# Patient Record
Sex: MALE | Employment: FULL TIME | URBAN - METROPOLITAN AREA
[De-identification: names, ages, dates, MRNs, and addresses within clinical notes are randomized per-mention and may not be internally consistent; named-entity substitution may affect disease eponyms.]

---

## 2017-02-21 DIAGNOSIS — Z13.220 ENCOUNTER FOR LIPID SCREENING FOR CARDIOVASCULAR DISEASE: Primary | ICD-10-CM

## 2017-02-21 DIAGNOSIS — Z13.6 ENCOUNTER FOR LIPID SCREENING FOR CARDIOVASCULAR DISEASE: Primary | ICD-10-CM

## 2017-02-21 DIAGNOSIS — R17 TOTAL BILIRUBIN, ELEVATED: ICD-10-CM

## 2017-02-22 ENCOUNTER — TELEPHONE (OUTPATIENT)
Dept: INTERNAL MEDICINE | Facility: CLINIC | Age: 32
End: 2017-02-22

## 2017-02-22 DIAGNOSIS — Z11.3 SCREEN FOR STD (SEXUALLY TRANSMITTED DISEASE): Primary | ICD-10-CM

## 2017-02-22 NOTE — TELEPHONE ENCOUNTER
----- Message from Costa Ramirez MD sent at 2/21/2017  5:32 PM CST -----  Contact: [phone]  Blood work submitted.  Determine if the patient would like HIV and syphilis testing prior to first appointment.  These tests are typically offered to young patients.  Otherwise, routine blood work has been ordered.    ----- Message -----     From: Lidya Aldana     Sent: 2/21/2017   4:35 PM       To: James SEVILLA Staff    Patient is requesting orders for blood work. Please advise.

## 2017-02-22 NOTE — TELEPHONE ENCOUNTER
----- Message from Costa Ramirez MD sent at 2/22/2017  2:52 PM CST -----  Please add HIV and RPR blood work to the patient scheduled laboratory appointment.

## 2017-03-13 ENCOUNTER — LAB VISIT (OUTPATIENT)
Dept: LAB | Facility: HOSPITAL | Age: 32
End: 2017-03-13
Attending: INTERNAL MEDICINE
Payer: COMMERCIAL

## 2017-03-13 DIAGNOSIS — Z13.220 ENCOUNTER FOR LIPID SCREENING FOR CARDIOVASCULAR DISEASE: ICD-10-CM

## 2017-03-13 DIAGNOSIS — R17 TOTAL BILIRUBIN, ELEVATED: ICD-10-CM

## 2017-03-13 DIAGNOSIS — Z11.3 SCREEN FOR STD (SEXUALLY TRANSMITTED DISEASE): ICD-10-CM

## 2017-03-13 DIAGNOSIS — Z13.6 ENCOUNTER FOR LIPID SCREENING FOR CARDIOVASCULAR DISEASE: ICD-10-CM

## 2017-03-13 LAB
ALBUMIN SERPL BCP-MCNC: 4.3 G/DL
ALP SERPL-CCNC: 58 U/L
ALT SERPL W/O P-5'-P-CCNC: 26 U/L
ANION GAP SERPL CALC-SCNC: 8 MMOL/L
AST SERPL-CCNC: 23 U/L
BASOPHILS # BLD AUTO: 0.02 K/UL
BASOPHILS NFR BLD: 0.4 %
BILIRUB SERPL-MCNC: 1.2 MG/DL
BUN SERPL-MCNC: 15 MG/DL
CALCIUM SERPL-MCNC: 10 MG/DL
CHLORIDE SERPL-SCNC: 105 MMOL/L
CHOLEST/HDLC SERPL: 3 {RATIO}
CO2 SERPL-SCNC: 28 MMOL/L
CREAT SERPL-MCNC: 1 MG/DL
DIFFERENTIAL METHOD: NORMAL
EOSINOPHIL # BLD AUTO: 0.2 K/UL
EOSINOPHIL NFR BLD: 2.8 %
ERYTHROCYTE [DISTWIDTH] IN BLOOD BY AUTOMATED COUNT: 13.3 %
EST. GFR  (AFRICAN AMERICAN): >60 ML/MIN/1.73 M^2
EST. GFR  (NON AFRICAN AMERICAN): >60 ML/MIN/1.73 M^2
GLUCOSE SERPL-MCNC: 95 MG/DL
HCT VFR BLD AUTO: 44.9 %
HDL/CHOLESTEROL RATIO: 33.3 %
HDLC SERPL-MCNC: 114 MG/DL
HDLC SERPL-MCNC: 38 MG/DL
HGB BLD-MCNC: 15.5 G/DL
HIV 1+2 AB+HIV1 P24 AG SERPL QL IA: NEGATIVE
LDLC SERPL CALC-MCNC: 57.6 MG/DL
LYMPHOCYTES # BLD AUTO: 1.8 K/UL
LYMPHOCYTES NFR BLD: 32.4 %
MCH RBC QN AUTO: 28.9 PG
MCHC RBC AUTO-ENTMCNC: 34.5 %
MCV RBC AUTO: 84 FL
MONOCYTES # BLD AUTO: 0.6 K/UL
MONOCYTES NFR BLD: 10.5 %
NEUTROPHILS # BLD AUTO: 2.9 K/UL
NEUTROPHILS NFR BLD: 53.5 %
NONHDLC SERPL-MCNC: 76 MG/DL
PLATELET # BLD AUTO: 178 K/UL
PMV BLD AUTO: 11.2 FL
POTASSIUM SERPL-SCNC: 4.1 MMOL/L
PROT SERPL-MCNC: 7.8 G/DL
RBC # BLD AUTO: 5.36 M/UL
SODIUM SERPL-SCNC: 141 MMOL/L
TRIGL SERPL-MCNC: 92 MG/DL
WBC # BLD AUTO: 5.44 K/UL

## 2017-03-13 PROCEDURE — 80053 COMPREHEN METABOLIC PANEL: CPT

## 2017-03-13 PROCEDURE — 85025 COMPLETE CBC W/AUTO DIFF WBC: CPT

## 2017-03-13 PROCEDURE — 86592 SYPHILIS TEST NON-TREP QUAL: CPT

## 2017-03-13 PROCEDURE — 86703 HIV-1/HIV-2 1 RESULT ANTBDY: CPT

## 2017-03-13 PROCEDURE — 80061 LIPID PANEL: CPT

## 2017-03-13 PROCEDURE — 36415 COLL VENOUS BLD VENIPUNCTURE: CPT | Mod: PO

## 2017-03-14 LAB — RPR SER QL: NORMAL

## 2017-03-21 ENCOUNTER — OFFICE VISIT (OUTPATIENT)
Dept: INTERNAL MEDICINE | Facility: CLINIC | Age: 32
End: 2017-03-21
Payer: COMMERCIAL

## 2017-03-21 VITALS
HEART RATE: 64 BPM | WEIGHT: 201.25 LBS | OXYGEN SATURATION: 98 % | BODY MASS INDEX: 23.29 KG/M2 | DIASTOLIC BLOOD PRESSURE: 80 MMHG | HEIGHT: 78 IN | SYSTOLIC BLOOD PRESSURE: 122 MMHG

## 2017-03-21 DIAGNOSIS — G43.009 MIGRAINE WITHOUT AURA AND WITHOUT STATUS MIGRAINOSUS, NOT INTRACTABLE: Primary | ICD-10-CM

## 2017-03-21 DIAGNOSIS — Q27.9 VENOUS MALFORMATION: ICD-10-CM

## 2017-03-21 DIAGNOSIS — Z23 NEED FOR TDAP VACCINATION: ICD-10-CM

## 2017-03-21 DIAGNOSIS — M25.371 ANKLE INSTABILITY, RIGHT: ICD-10-CM

## 2017-03-21 PROBLEM — E80.6 BENIGN UNCONJUGATED HYPERBILIRUBINEMIA: Status: ACTIVE | Noted: 2017-03-21

## 2017-03-21 PROCEDURE — 99385 PREV VISIT NEW AGE 18-39: CPT | Mod: 25,S$GLB,, | Performed by: INTERNAL MEDICINE

## 2017-03-21 PROCEDURE — 99999 PR PBB SHADOW E&M-EST. PATIENT-LVL III: CPT | Mod: PBBFAC,,, | Performed by: INTERNAL MEDICINE

## 2017-03-21 PROCEDURE — 90471 IMMUNIZATION ADMIN: CPT | Mod: S$GLB,,, | Performed by: INTERNAL MEDICINE

## 2017-03-21 PROCEDURE — 90715 TDAP VACCINE 7 YRS/> IM: CPT | Mod: S$GLB,,, | Performed by: INTERNAL MEDICINE

## 2017-03-21 RX ORDER — SUMATRIPTAN SUCCINATE 100 MG/1
TABLET ORAL
Qty: 8 TABLET | Refills: 2 | Status: SHIPPED | OUTPATIENT
Start: 2017-03-21

## 2017-03-21 NOTE — PROGRESS NOTES
Portions of this note are generated with voice recognition software. Typographical errors may exist.       Patient Name:ROSARIO GUSMAN  Patient MRN:   [id number]    History of Present Illness   ================================================================  ROSARIO GUSMAN is a 31 y.o. male here for primary care visit for  Chief Complaint   Patient presents with    Establish Care    Headache       Past Medical History:   Diagnosis Date    Testicular torsion        History reviewed. No pertinent surgical history.    Review of patient's allergies indicates:  No Known Allergies    No current outpatient prescriptions on file prior to visit.     No current facility-administered medications on file prior to visit.        Family History   Problem Relation Age of Onset    Hyperlipidemia Father     Diabetes Mellitus Neg Hx        Social History     Social History    Marital status: Significant Other     Spouse name: N/A    Number of children: N/A    Years of education: N/A     Occupational History    Not on file.     Social History Main Topics    Smoking status: Never Smoker    Smokeless tobacco: Never Used    Alcohol use Yes      Comment: not often    Drug use: No    Sexual activity: Yes     Partners: Female      Comment: together monogamous since 2016 approximately.  Never tested positive for STD.     Other Topics Concern    Not on file     Social History Narrative    No narrative on file       History   Sexual Activity    Sexual activity: Yes    Partners: Female     Comment: together monogamous since 2016 approximately.  Never tested positive for STD.         SUBJECTIVE:    Headaches.  Since high school the patient states that he has had severe bifrontal headaches.  No classic aura.  Not associated with sinusitis.  Headaches lasting for several days.  Excedrin helping.  Family history of migraine headache paternal uncle.  Headaches occur perhaps once or twice per month.  Triggers are unknown.  Patient has  never tried triptan therapy.  Most headaches improved with Excedrin but not always.    Ankle instability.  Patient states in high school he had severe sprain of his right ankle.  Did not have orthopedic surgery.  States that ever since he has had problems with instability of his ankle and causing him to over pronate.  States that this happens with simple activities even while at work.  Patient does not engage in aerobic activity so it is not a major stumbling block for him now.    Patient is in a mutually monogamous relationship with female partner    Venous malformation.  Patient states that for more than 5 years he has had a varicose vein on his forearm right arm.  States it does not bother him but does not know what it is.  States he does not have significant symptoms from it      Medications Reviewed and Updated    Past medical, family, and social histories were reviewed and updated.    Review of Systems negative unless otherwise noted in history of present illness-  General ROS: negative  Psychological ROS: negative  ENT ROS: negative  Allergy and Immunology ROS: negative  Cardiovascular ROS: negative  Gastrointestinal ROS: negative  Genito-Urinary ROS: negative  Musculoskeletal ROS: negative  Neurological ROS: negative  Dermatological ROS: negative      Allergic:  Review of patient's allergies indicates:  No Known Allergies    OBJECTIVE:  BP: 122/80 Pulse: 64    Wt Readings from Last 3 Encounters:   03/21/17 91.3 kg (201 lb 4.5 oz)    Body mass index is 23.26 kg/(m^2).  Previous Blood Pressure Readings :   BP Readings from Last 3 Encounters:   03/21/17 122/80       GEN: healthy appearing  HEENT: sclera non-icteric, conjunctiva clear.  No significant periodontal disease.  No cervical lymphadenopathy  CV: no peripheral edema regular rate and rhythm.  No murmurs  PULM: breathing non-labored no wheezing bilateral lung fields  ABD: No organomegaly.  Negative Umana sign.  PSYCH: appropriate affect  MSK: able to  rise from chair without assistance  SKIN: normal skin turgor      Pertinent Labs Reviewed           ASSESSMENT/PLAN:    Migraine without aura and without status migrainosus, not intractable detailed counseling on self-care measures. educated patient on common and serious side effects of this medication. patient advised if symptoms change or intensify to seek care in the nearest [facility] health center     -     sumatriptan (IMITREX) 100 MG tablet; Take 1 tab first sign of migraine. 2nd tab 2 hrs later as needed. No more than 2 / 24 hrs  Dispense: 8 tablet; Refill: 2    Need for Tdap vaccination  -     Tdap Vaccine    Venous malformation upper extremitiy counseling on self-care measures.  Watchful waiting.    Ankle instability, right.  Counseling on self-care measures.    No future appointments.    Costa Ramirez  3/21/2017  3:19 PM

## 2017-03-21 NOTE — MR AVS SNAPSHOT
[facility]   [place]  [place] [address]  Phone: [phone]  Fax: [phone]                  Bjorn Godinez   3/21/2017 3:00 PM   Office Visit    Description:  Male : 1985   Provider:  Costa Ramirez MD   Department:  [facility]           Reason for Visit     Establish Care           Diagnoses this Visit        Comments    Migraine without aura and without status migrainosus, not intractable    -  Primary     Need for Tdap vaccination         Venous malformation         Ankle instability, right                To Do List           Goals (5 Years of Data)     None       These Medications        Disp Refills Start End    sumatriptan (IMITREX) 100 MG tablet 8 tablet 2 3/21/2017     Take 1 tab first sign of migraine. 2nd tab 2 hrs later as needed. No more than 2 / 24 hrs    Pharmacy: [facility]/pharmacy #[id number] - [place], [place] - [address] [facility] Ph #: [phone]         [facility]     [facility] On Call Nurse [facility] -  Assistance  Registered nurses in the [facility] provide clinical advisement, health education, appointment booking, and other advisory services.  Call for this free service at [phone].             Medications           Message regarding Medications     Verify the changes and/or additions to your medication regime listed below are the same as discussed with your clinician today.  If any of these changes or additions are incorrect, please notify your healthcare provider.        START taking these NEW medications        Refills    sumatriptan (IMITREX) 100 MG tablet 2    Sig: Take 1 tab first sign of migraine. 2nd tab 2 hrs later as needed. No more than 2 / 24 hrs    Class: Normal           Verify that the below list of medications is an accurate representation of the medications you are currently taking.  If none reported, the list may be blank. If incorrect, please contact your healthcare provider. Carry this  "list with you in case of emergency.           Current Medications     sumatriptan (IMITREX) 100 MG tablet Take 1 tab first sign of migraine. 2nd tab 2 hrs later as needed. No more than 2 / 24 hrs           Clinical Reference Information           Your Vitals Were     BP Pulse Height Weight SpO2 BMI    122/80 (BP Location: Right arm, Patient Position: Sitting, BP Method: Manual) 64 6' 6" (1.981 m) 91.3 kg (201 lb 4.5 oz) 98% 23.26 kg/m2      Blood Pressure          Most Recent Value    BP  122/80      Allergies as of 3/21/2017     No Known Allergies      Immunizations Administered on Date of Encounter - 3/21/2017     Name Date Dose VIS Date Route    TDAP 3/21/2017 0.5 mL 2/24/2015 Intramuscular      Orders Placed During Today's Visit      Normal Orders This Visit    Tdap Vaccine       Instructions        Sumatriptan tablets (Imitrex)   What is this medicine?  SUMATRIPTAN (eslma ma TRIP tan) is used to treat migraines with or without aura. An aura is a strange feeling or visual disturbance that warns you of an attack. It is not used to prevent migraines.  How should I use this medicine?  Take this medicine by mouth with a glass of water. Follow the directions on the prescription label. This medicine is taken at the first symptoms of a migraine. It is not for everyday use. If your migraine headache returns after one dose, you can take another dose as directed. You must leave at least 2 hours between doses, and do not take more than 100 mg as a single dose. Do not take more than 200 mg total in any 24 hour period. If there is no improvement at all after the first dose, do not take a second dose without talking to your doctor or health care professional. Do not take your medicine more often than directed.  Talk to your pediatrician regarding the use of this medicine in children. Special care may be needed.  What side effects may I notice from receiving this medicine?  Side effects that you should report to your doctor or " health care professional as soon as possible:  · allergic reactions like skin rash, itching or hives, swelling of the face, lips, or tongue  · bloody or watery diarrhea  · hallucination, loss of contact with reality  · pain, tingling, numbness in the face, hands, or feet  · seizures  · signs and symptoms of a blood clot such as breathing problems; changes in vision; chest pain; severe, sudden headache; pain, swelling, warmth in the leg; trouble speaking; sudden numbness or weakness of the face, arm, or leg  · signs and symptoms of a dangerous change in heartbeat or heart rhythm like chest pain; dizziness; fast or irregular heartbeat; palpitations, feeling faint or lightheaded; falls; breathing problems  · signs and symptoms of a stroke like changes in vision; confusion; trouble speaking or understanding; severe headaches; sudden numbness or weakness of the face, arm, or leg; trouble walking; dizziness; loss of balance or coordination  · stomach pain  Side effects that usually do not require medical attention (report these to your doctor or health care professional if they continue or are bothersome):  · changes in taste  · facial flushing  · headache  · muscle cramps  · muscle pain  · nausea, vomiting  · weak or tired  What may interact with this medicine?  Do not take this medicine with any of the following medicines:  · cocaine  · ergot alkaloids like dihydroergotamine, ergonovine, ergotamine, methylergonovine  · feverfew  · MAOIs like Carbex, Eldepryl, Marplan, Nardil, and Parnate  · other medicines for migraine headache like almotriptan, eletriptan, frovatriptan, naratriptan, rizatriptan, zolmitriptan  · tryptophan  This medicine may also interact with the following medications:  · certain medicines for depression, anxiety, or psychotic disturbances  What if I miss a dose?  This does not apply; this medicine is not for regular use.  Where should I keep my medicine?  Keep out of the reach of children.  Store at  room temperature between 2 and 30 degrees C (36 and 86 degrees F). Throw away any unused medicine after the expiration date.  What should I tell my health care provider before I take this medicine?  They need to know if you have any of these conditions:  · circulation problems in fingers and toes  · diabetes  · heart disease  · high blood pressure  · high cholesterol  · history of irregular heartbeat  · history of stroke  · kidney disease  · liver disease  · postmenopausal or surgical removal of uterus and ovaries  · seizures  · smoke tobacco  · stomach or intestine problems  · an unusual or allergic reaction to sumatriptan, other medicines, foods, dyes, or preservatives  · pregnant or trying to get pregnant  · breast-feeding  What should I watch for while using this medicine?  Only take this medicine for a migraine headache. Take it if you get warning symptoms or at the start of a migraine attack. It is not for regular use to prevent migraine attacks.  You may get drowsy or dizzy. Do not drive, use machinery, or do anything that needs mental alertness until you know how this medicine affects you. To reduce dizzy or fainting spells, do not sit or stand up quickly, especially if you are an older patient. Alcohol can increase drowsiness, dizziness and flushing. Avoid alcoholic drinks.  Smoking cigarettes may increase the risk of heart-related side effects from using this medicine.  If you take migraine medicines for 10 or more days a month, your migraines may get worse. Keep a diary of headache days and medicine use. Contact your healthcare professional if your migraine attacks occur more frequently.  Date Last Reviewed:   NOTE:This sheet is a summary. It may not cover all possible information. If you have questions about this medicine, talk to your doctor, pharmacist, or health care provider. Copyright© 2016 [employer]        Preventing Migraine Headaches: Triggers  The first step in preventing migraines is to  learn what triggers them. You may then be able to control your triggers to avoid or reduce the severity of your migraines.     Know your triggers  Be aware that you may have more than one trigger, and that some triggers may work together. Common migraine triggers include:  · Food and nutrition. Skipping meals or not drinking enough water can trigger headaches. So can certain foods, such as caffeine, monosodium glutamate (MSG), aged cheese, or sausage.  · Alcohol. Red wine and other alcoholic beverages are common migraine triggers.  · Chemicals. Scents, cleaning products, gasoline, glue, perfume, and paint can be triggers. So can tobacco smoke, including secondhand smoke.  · Emotions. Stress can trigger headaches or make them worse once they begin.  · Sleep disruption. Staying up late, sleeping late, and traveling across time zones can disrupt your sleep cycle, triggering headaches.  · Hormones. Many women notice that migraines tend to happen at a certain point in their menstrual cycle. Birth control pills or hormone replacement therapy may also trigger migraines.  · Environment and weather. Air travel, changes in altitude, air pressure changes, hot sun, or bright or flashing lights can be triggers.    Control your triggers  These are some of the things you can do to try to control triggers:  · Avoid triggers if you can. For example, stay clear of alcohol and foods that trigger your headaches. Use unscented household products. Keep regular sleep habits. Manage stress to help control emotional triggers.  · Change your behavior at times when triggers can't be avoided. For example, make sure to get enough rest and drink plenty of water while you're traveling. Make sure to carry a hat, sunglasses, and your medicines. Be alert for migraine symptoms, so you can treat a migraine early if it happens.  Date Last Reviewed: 10/9/2015  © 3091-5323 [employer]. [address] [place] [address], [place] [address]. All rights  reserved. This information is not intended as a substitute for professional medical care. Always follow your healthcare professional's instructions.        Preventing Migraine Headaches: Medicines and Lifestyle Changes  A migraine is a type of severe headache. Having a migraine can be very painful. But there are steps you can take to help prevent migraines.    Medicines to help prevent migraines  · Your healthcare provider may prescribe certain medicines to help prevent migraines. These medicines may need to be taken daily. Or they may only need to be taken at times when youre likely to have a migraine.  · Common medicines used to help prevent migraines include:  ¨ Triptans (serotonin receptor agonists)  ¨ Nonsteroidal anti-inflammatory drugs (available over-the-counter)  ¨ Beta-blockers  ¨ Anticonvulsants  ¨ Tricyclic antidepressants  ¨ Calcium channel blockers  ¨ Certain vitamins, minerals, and plant extracts  ¨ Botulinum toxin injection (Botox) for certain chronic migraines   ¨ CGRP (calcitonin gene-related peptide) agnonists are being reviewed by the Food and Drug Administration (FDA)  Lifestyle changes for long-term prevention  Here are some suggestions:  · Exercise. Regular exercise can help prevent migraines and improve your health. (If exercise triggers your migraines, talk to your healthcare provider.)  · Keep regular habits. Dont skip or delay meals. Drink plenty of water. And go to bed and get up at about the same time each day. This includes weekends.  · Try alternative treatments. These are treatments that do not involve the use of medicines or surgery. They may help relieve symptoms and prevent migraines. Some treatment options include biofeedback and acupuncture. Ask your healthcare provider to tell you more about these treatments if you have questions.  · Limit caffeine. You may find that caffeine helps relieve pain during an attack. But too much caffeine can also trigger migraines. So, limit the  amount of caffeine you consume.  Date Last Reviewed: 10/11/2015  © 9846-4590 [employer]. [address] [place] [address], [place] [address]. All rights reserved. This information is not intended as a substitute for professional medical care. Always follow your healthcare professional's instructions.             Language Assistance Services     ATTENTION: Language assistance services are available, free of charge. Please call [phone].      ATENCIÓN: Si habla español, tiene a guy disposición servicios gratuitos de asistencia lingüística. Llame al [phone].     [facility] Ý: N?u b?n nói Ti?ng Vi?t, có các d?ch v? h? tr? ngôn ng? mi?n phí dành cho b?n. G?i s? [phone].         [facility] complies with applicable Federal civil rights laws and does not discriminate on the basis of race, color, national origin, age, disability, or sex.

## 2017-03-21 NOTE — PATIENT INSTRUCTIONS
Sumatriptan tablets (Imitrex)   What is this medicine?  SUMATRIPTAN (selma ma TRIP tan) is used to treat migraines with or without aura. An aura is a strange feeling or visual disturbance that warns you of an attack. It is not used to prevent migraines.  How should I use this medicine?  Take this medicine by mouth with a glass of water. Follow the directions on the prescription label. This medicine is taken at the first symptoms of a migraine. It is not for everyday use. If your migraine headache returns after one dose, you can take another dose as directed. You must leave at least 2 hours between doses, and do not take more than 100 mg as a single dose. Do not take more than 200 mg total in any 24 hour period. If there is no improvement at all after the first dose, do not take a second dose without talking to your doctor or health care professional. Do not take your medicine more often than directed.  Talk to your pediatrician regarding the use of this medicine in children. Special care may be needed.  What side effects may I notice from receiving this medicine?  Side effects that you should report to your doctor or health care professional as soon as possible:  · allergic reactions like skin rash, itching or hives, swelling of the face, lips, or tongue  · bloody or watery diarrhea  · hallucination, loss of contact with reality  · pain, tingling, numbness in the face, hands, or feet  · seizures  · signs and symptoms of a blood clot such as breathing problems; changes in vision; chest pain; severe, sudden headache; pain, swelling, warmth in the leg; trouble speaking; sudden numbness or weakness of the face, arm, or leg  · signs and symptoms of a dangerous change in heartbeat or heart rhythm like chest pain; dizziness; fast or irregular heartbeat; palpitations, feeling faint or lightheaded; falls; breathing problems  · signs and symptoms of a stroke like changes in vision; confusion; trouble speaking or  understanding; severe headaches; sudden numbness or weakness of the face, arm, or leg; trouble walking; dizziness; loss of balance or coordination  · stomach pain  Side effects that usually do not require medical attention (report these to your doctor or health care professional if they continue or are bothersome):  · changes in taste  · facial flushing  · headache  · muscle cramps  · muscle pain  · nausea, vomiting  · weak or tired  What may interact with this medicine?  Do not take this medicine with any of the following medicines:  · cocaine  · ergot alkaloids like dihydroergotamine, ergonovine, ergotamine, methylergonovine  · feverfew  · MAOIs like Carbex, Eldepryl, Marplan, Nardil, and Parnate  · other medicines for migraine headache like almotriptan, eletriptan, frovatriptan, naratriptan, rizatriptan, zolmitriptan  · tryptophan  This medicine may also interact with the following medications:  · certain medicines for depression, anxiety, or psychotic disturbances  What if I miss a dose?  This does not apply; this medicine is not for regular use.  Where should I keep my medicine?  Keep out of the reach of children.  Store at room temperature between 2 and 30 degrees C (36 and 86 degrees F). Throw away any unused medicine after the expiration date.  What should I tell my health care provider before I take this medicine?  They need to know if you have any of these conditions:  · circulation problems in fingers and toes  · diabetes  · heart disease  · high blood pressure  · high cholesterol  · history of irregular heartbeat  · history of stroke  · kidney disease  · liver disease  · postmenopausal or surgical removal of uterus and ovaries  · seizures  · smoke tobacco  · stomach or intestine problems  · an unusual or allergic reaction to sumatriptan, other medicines, foods, dyes, or preservatives  · pregnant or trying to get pregnant  · breast-feeding  What should I watch for while using this medicine?  Only take  this medicine for a migraine headache. Take it if you get warning symptoms or at the start of a migraine attack. It is not for regular use to prevent migraine attacks.  You may get drowsy or dizzy. Do not drive, use machinery, or do anything that needs mental alertness until you know how this medicine affects you. To reduce dizzy or fainting spells, do not sit or stand up quickly, especially if you are an older patient. Alcohol can increase drowsiness, dizziness and flushing. Avoid alcoholic drinks.  Smoking cigarettes may increase the risk of heart-related side effects from using this medicine.  If you take migraine medicines for 10 or more days a month, your migraines may get worse. Keep a diary of headache days and medicine use. Contact your healthcare professional if your migraine attacks occur more frequently.  Date Last Reviewed:   NOTE:This sheet is a summary. It may not cover all possible information. If you have questions about this medicine, talk to your doctor, pharmacist, or health care provider. Copyright© 2016 [employer]        Preventing Migraine Headaches: Triggers  The first step in preventing migraines is to learn what triggers them. You may then be able to control your triggers to avoid or reduce the severity of your migraines.     Know your triggers  Be aware that you may have more than one trigger, and that some triggers may work together. Common migraine triggers include:  · Food and nutrition. Skipping meals or not drinking enough water can trigger headaches. So can certain foods, such as caffeine, monosodium glutamate (MSG), aged cheese, or sausage.  · Alcohol. Red wine and other alcoholic beverages are common migraine triggers.  · Chemicals. Scents, cleaning products, gasoline, glue, perfume, and paint can be triggers. So can tobacco smoke, including secondhand smoke.  · Emotions. Stress can trigger headaches or make them worse once they begin.  · Sleep disruption. Staying up late,  sleeping late, and traveling across time zones can disrupt your sleep cycle, triggering headaches.  · Hormones. Many women notice that migraines tend to happen at a certain point in their menstrual cycle. Birth control pills or hormone replacement therapy may also trigger migraines.  · Environment and weather. Air travel, changes in altitude, air pressure changes, hot sun, or bright or flashing lights can be triggers.    Control your triggers  These are some of the things you can do to try to control triggers:  · Avoid triggers if you can. For example, stay clear of alcohol and foods that trigger your headaches. Use unscented household products. Keep regular sleep habits. Manage stress to help control emotional triggers.  · Change your behavior at times when triggers can't be avoided. For example, make sure to get enough rest and drink plenty of water while you're traveling. Make sure to carry a hat, sunglasses, and your medicines. Be alert for migraine symptoms, so you can treat a migraine early if it happens.  Date Last Reviewed: 10/9/2015  © 2138-2232 [employer]. [address] [place] [address]. All rights reserved. This information is not intended as a substitute for professional medical care. Always follow your healthcare professional's instructions.        Preventing Migraine Headaches: Medicines and Lifestyle Changes  A migraine is a type of severe headache. Having a migraine can be very painful. But there are steps you can take to help prevent migraines.    Medicines to help prevent migraines  · Your healthcare provider may prescribe certain medicines to help prevent migraines. These medicines may need to be taken daily. Or they may only need to be taken at times when youre likely to have a migraine.  · Common medicines used to help prevent migraines include:  ¨ Triptans (serotonin receptor agonists)  ¨ Nonsteroidal anti-inflammatory drugs (available  over-the-counter)  ¨ Beta-blockers  ¨ Anticonvulsants  ¨ Tricyclic antidepressants  ¨ Calcium channel blockers  ¨ Certain vitamins, minerals, and plant extracts  ¨ Botulinum toxin injection (Botox) for certain chronic migraines   ¨ CGRP (calcitonin gene-related peptide) agnonists are being reviewed by the Food and Drug Administration (FDA)  Lifestyle changes for long-term prevention  Here are some suggestions:  · Exercise. Regular exercise can help prevent migraines and improve your health. (If exercise triggers your migraines, talk to your healthcare provider.)  · Keep regular habits. Dont skip or delay meals. Drink plenty of water. And go to bed and get up at about the same time each day. This includes weekends.  · Try alternative treatments. These are treatments that do not involve the use of medicines or surgery. They may help relieve symptoms and prevent migraines. Some treatment options include biofeedback and acupuncture. Ask your healthcare provider to tell you more about these treatments if you have questions.  · Limit caffeine. You may find that caffeine helps relieve pain during an attack. But too much caffeine can also trigger migraines. So, limit the amount of caffeine you consume.  Date Last Reviewed: 10/11/2015  © 5837-2955 [employer]. [address] [place] [address], [place] [address]. All rights reserved. This information is not intended as a substitute for professional medical care. Always follow your healthcare professional's instructions.